# Patient Record
Sex: MALE
[De-identification: names, ages, dates, MRNs, and addresses within clinical notes are randomized per-mention and may not be internally consistent; named-entity substitution may affect disease eponyms.]

---

## 2022-10-13 ENCOUNTER — APPOINTMENT (OUTPATIENT)
Dept: PODIATRY | Facility: CLINIC | Age: 37
End: 2022-10-13

## 2022-10-13 PROBLEM — Z00.00 ENCOUNTER FOR PREVENTIVE HEALTH EXAMINATION: Status: ACTIVE | Noted: 2022-10-13

## 2022-11-10 ENCOUNTER — APPOINTMENT (OUTPATIENT)
Dept: PODIATRY | Facility: CLINIC | Age: 37
End: 2022-11-10

## 2022-11-16 ENCOUNTER — APPOINTMENT (OUTPATIENT)
Dept: PODIATRY | Facility: CLINIC | Age: 37
End: 2022-11-16

## 2022-11-16 DIAGNOSIS — Z83.3 FAMILY HISTORY OF DIABETES MELLITUS: ICD-10-CM

## 2022-11-16 DIAGNOSIS — Z77.22 CONTACT WITH AND (SUSPECTED) EXPOSURE TO ENVIRONMENTAL TOBACCO SMOKE (ACUTE) (CHRONIC): ICD-10-CM

## 2022-11-16 DIAGNOSIS — Z78.9 OTHER SPECIFIED HEALTH STATUS: ICD-10-CM

## 2022-11-16 DIAGNOSIS — F17.210 NICOTINE DEPENDENCE, CIGARETTES, UNCOMPLICATED: ICD-10-CM

## 2022-11-16 DIAGNOSIS — S92.516A: ICD-10-CM

## 2022-11-16 PROCEDURE — 99202 OFFICE O/P NEW SF 15 MIN: CPT

## 2022-11-16 PROCEDURE — 99072 ADDL SUPL MATRL&STAF TM PHE: CPT

## 2022-11-16 PROCEDURE — 73630 X-RAY EXAM OF FOOT: CPT | Mod: RT

## 2022-11-18 NOTE — CONSULT LETTER
[Dear  ___] : Dear  [unfilled], [Consult Letter:] : I had the pleasure of evaluating your patient, [unfilled]. [Please see my note below.] : Please see my note below. [Consult Closing:] : Thank you very much for allowing me to participate in the care of this patient.  If you have any questions, please do not hesitate to contact me. [Sincerely,] : Sincerely, [FreeTextEntry2] : Dr. Gabriel (?) [FreeTextEntry3] : Charles Lombardi, DPM

## 2022-11-18 NOTE — ASSESSMENT
[FreeTextEntry1] : Impression: Minimally displaced fracture, proximal phalanx head, right 5th toe (S92.025J).\par \par Treatment: Explained that I do not feel that surgery is necessary.  He is to continue limited activities.  Coban dressing applied.  Patient instructed on its reapplication.\par Presently, he is out of work but would anticipate return to work on 12/02/22 as long as it heals satisfactorily.  Explained the risks, complications and alternatives.\par The fracture is causally related to the initial work injury that occurred on 10/11/22.  Any questions or problems, patient is to contact the office.

## 2022-11-18 NOTE — HISTORY OF PRESENT ILLNESS
[Sneakers] : kaila [FreeTextEntry1] : 37 year old male presents today for evaluation of a work-sustained foot injury that occurred on 10/11/2022.  A fire extinguisher fell on his right  5th toe and he is still having pain and swelling.  He was told that it should be operated on and another said it did not need to operated on.  He is here for evaluation with pain and swelling to the right 5th toe.  There is no gross deformity.  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

## 2022-11-18 NOTE — PROCEDURE
Addended by: ROSALBA GARVIN on: 8/30/2021 10:40 AM     Modules accepted: Orders    
[FreeTextEntry1] : X-rays: (3 views - right foot) There is a minimally displaced head of the proximal phalanx fracture, right 5th toe.

## 2022-11-18 NOTE — PHYSICAL EXAM
[Skin Color & Pigmentation] : normal skin color and pigmentation [Skin Turgor] : normal skin turgor [] : no rash [Skin Lesions] : no skin lesions [Sensation] : the sensory exam was normal to light touch and pinprick [General Appearance - Alert] : alert [General Appearance - Well Nourished] : well nourished [General Appearance - Well-Appearing] : healthy appearing [2+] : left foot dorsalis pedis 2+ [No Focal Deficits] : no focal deficits [Deep Tendon Reflexes (DTR)] : deep tendon reflexes were 2+ and symmetric [Motor Exam] : the motor exam was normal [Delayed in the Right Toes] : capillary refills normal in right toes [Delayed in the Left Toes] : capillary refills normal in the left toes [de-identified] : \par No gross deformity to the right 5th toe.  \par  [FreeTextEntry1] : Swelling to the right 5th  toe.

## 2022-11-30 ENCOUNTER — APPOINTMENT (OUTPATIENT)
Dept: PODIATRY | Facility: CLINIC | Age: 37
End: 2022-11-30